# Patient Record
Sex: FEMALE | Race: WHITE | NOT HISPANIC OR LATINO | Employment: UNEMPLOYED | URBAN - METROPOLITAN AREA
[De-identification: names, ages, dates, MRNs, and addresses within clinical notes are randomized per-mention and may not be internally consistent; named-entity substitution may affect disease eponyms.]

---

## 2019-12-06 LAB
EXTERNAL ABO GROUPING: NORMAL
EXTERNAL HEPATITIS B SURFACE ANTIGEN: NEGATIVE
EXTERNAL HEPATITIS C AB: NEGATIVE
EXTERNAL RH FACTOR: NEGATIVE
EXTERNAL RUBELLA QUALITATIVE: NORMAL
EXTERNAL SYPHILIS RPR SCREEN: NORMAL
HIV1 P24 AG SERPL QL IA: NEGATIVE

## 2020-04-11 ENCOUNTER — HOSPITAL ENCOUNTER (INPATIENT)
Facility: HOSPITAL | Age: 30
LOS: 2 days | Discharge: HOME | End: 2020-04-13
Attending: OBSTETRICS & GYNECOLOGY | Admitting: OBSTETRICS & GYNECOLOGY

## 2020-04-11 ENCOUNTER — APPOINTMENT (OUTPATIENT)
Dept: ULTRASOUND IMAGING | Facility: HOSPITAL | Age: 30
End: 2020-04-11

## 2020-04-11 PROBLEM — O36.4XX0 IUFD AT 20 WEEKS OR MORE OF GESTATION: Status: ACTIVE | Noted: 2020-04-11

## 2020-04-11 LAB
ABO GROUP BLD: NORMAL
AMPHET+METHAMPHET UR QL: NEGATIVE
BARBITURATES UR QL SCN: NEGATIVE
BASOPHILS # BLD AUTO: 0.1 10*3/MM3 (ref 0–0.2)
BASOPHILS NFR BLD AUTO: 0.7 % (ref 0–1.5)
BENZODIAZ UR QL SCN: NEGATIVE
BLD GP AB SCN SERPL QL: POSITIVE
CANNABINOIDS SERPL QL: POSITIVE
COCAINE UR QL: NEGATIVE
DEPRECATED RDW RBC AUTO: 44.6 FL (ref 37–54)
EOSINOPHIL # BLD AUTO: 0 10*3/MM3 (ref 0–0.4)
EOSINOPHIL NFR BLD AUTO: 0.3 % (ref 0.3–6.2)
ERYTHROCYTE [DISTWIDTH] IN BLOOD BY AUTOMATED COUNT: 14.1 % (ref 12.3–15.4)
HCT VFR BLD AUTO: 38.6 % (ref 34–46.6)
HGB BLD-MCNC: 13.3 G/DL (ref 12–15.9)
LYMPHOCYTES # BLD AUTO: 1 10*3/MM3 (ref 0.7–3.1)
LYMPHOCYTES NFR BLD AUTO: 10 % (ref 19.6–45.3)
MCH RBC QN AUTO: 31.1 PG (ref 26.6–33)
MCHC RBC AUTO-ENTMCNC: 34.5 G/DL (ref 31.5–35.7)
MCV RBC AUTO: 90.2 FL (ref 79–97)
METHADONE UR QL SCN: NEGATIVE
MONOCYTES # BLD AUTO: 0.3 10*3/MM3 (ref 0.1–0.9)
MONOCYTES NFR BLD AUTO: 3.2 % (ref 5–12)
NEUTROPHILS # BLD AUTO: 8.8 10*3/MM3 (ref 1.7–7)
NEUTROPHILS NFR BLD AUTO: 85.8 % (ref 42.7–76)
NRBC BLD AUTO-RTO: 0.1 /100 WBC (ref 0–0.2)
OPIATES UR QL: NEGATIVE
OXYCODONE UR QL SCN: NEGATIVE
PLATELET # BLD AUTO: 226 10*3/MM3 (ref 140–450)
PMV BLD AUTO: 8.6 FL (ref 6–12)
RBC # BLD AUTO: 4.28 10*6/MM3 (ref 3.77–5.28)
RESIDUAL RHIG DETECTED: NORMAL
RH BLD: NEGATIVE
T&S EXPIRATION DATE: NORMAL
T4 FREE SERPL-MCNC: 1.13 NG/DL (ref 0.93–1.7)
TSH SERPL DL<=0.05 MIU/L-ACNC: 1.95 UIU/ML (ref 0.27–4.2)
WBC NRBC COR # BLD: 10.2 10*3/MM3 (ref 3.4–10.8)
WHOLE BLOOD HOLD SPECIMEN: NORMAL

## 2020-04-11 PROCEDURE — 80307 DRUG TEST PRSMV CHEM ANLYZR: CPT | Performed by: OBSTETRICS & GYNECOLOGY

## 2020-04-11 PROCEDURE — 86777 TOXOPLASMA ANTIBODY: CPT | Performed by: OBSTETRICS & GYNECOLOGY

## 2020-04-11 PROCEDURE — 86870 RBC ANTIBODY IDENTIFICATION: CPT | Performed by: OBSTETRICS & GYNECOLOGY

## 2020-04-11 PROCEDURE — 86900 BLOOD TYPING SEROLOGIC ABO: CPT

## 2020-04-11 PROCEDURE — 81291 MTHFR GENE: CPT | Performed by: OBSTETRICS & GYNECOLOGY

## 2020-04-11 PROCEDURE — 86645 CMV ANTIBODY IGM: CPT | Performed by: OBSTETRICS & GYNECOLOGY

## 2020-04-11 PROCEDURE — 85307 ASSAY ACTIVATED PROTEIN C: CPT | Performed by: OBSTETRICS & GYNECOLOGY

## 2020-04-11 PROCEDURE — 81241 F5 GENE: CPT | Performed by: OBSTETRICS & GYNECOLOGY

## 2020-04-11 PROCEDURE — 84439 ASSAY OF FREE THYROXINE: CPT | Performed by: OBSTETRICS & GYNECOLOGY

## 2020-04-11 PROCEDURE — 83036 HEMOGLOBIN GLYCOSYLATED A1C: CPT | Performed by: OBSTETRICS & GYNECOLOGY

## 2020-04-11 PROCEDURE — 86592 SYPHILIS TEST NON-TREP QUAL: CPT | Performed by: OBSTETRICS & GYNECOLOGY

## 2020-04-11 PROCEDURE — 85732 THROMBOPLASTIN TIME PARTIAL: CPT | Performed by: OBSTETRICS & GYNECOLOGY

## 2020-04-11 PROCEDURE — 86901 BLOOD TYPING SEROLOGIC RH(D): CPT

## 2020-04-11 PROCEDURE — 85210 CLOT FACTOR II PROTHROM SPEC: CPT | Performed by: OBSTETRICS & GYNECOLOGY

## 2020-04-11 PROCEDURE — 84443 ASSAY THYROID STIM HORMONE: CPT | Performed by: OBSTETRICS & GYNECOLOGY

## 2020-04-11 PROCEDURE — 85705 THROMBOPLASTIN INHIBITION: CPT | Performed by: OBSTETRICS & GYNECOLOGY

## 2020-04-11 PROCEDURE — 86694 HERPES SIMPLEX NES ANTBDY: CPT | Performed by: OBSTETRICS & GYNECOLOGY

## 2020-04-11 PROCEDURE — 86778 TOXOPLASMA ANTIBODY IGM: CPT | Performed by: OBSTETRICS & GYNECOLOGY

## 2020-04-11 PROCEDURE — 86850 RBC ANTIBODY SCREEN: CPT | Performed by: OBSTETRICS & GYNECOLOGY

## 2020-04-11 PROCEDURE — 86900 BLOOD TYPING SEROLOGIC ABO: CPT | Performed by: OBSTETRICS & GYNECOLOGY

## 2020-04-11 PROCEDURE — 86762 RUBELLA ANTIBODY: CPT | Performed by: OBSTETRICS & GYNECOLOGY

## 2020-04-11 PROCEDURE — 86644 CMV ANTIBODY: CPT | Performed by: OBSTETRICS & GYNECOLOGY

## 2020-04-11 PROCEDURE — 85300 ANTITHROMBIN III ACTIVITY: CPT | Performed by: OBSTETRICS & GYNECOLOGY

## 2020-04-11 PROCEDURE — 85025 COMPLETE CBC W/AUTO DIFF WBC: CPT | Performed by: OBSTETRICS & GYNECOLOGY

## 2020-04-11 PROCEDURE — 85670 THROMBIN TIME PLASMA: CPT | Performed by: OBSTETRICS & GYNECOLOGY

## 2020-04-11 PROCEDURE — 86747 PARVOVIRUS ANTIBODY: CPT | Performed by: OBSTETRICS & GYNECOLOGY

## 2020-04-11 PROCEDURE — 86147 CARDIOLIPIN ANTIBODY EA IG: CPT | Performed by: OBSTETRICS & GYNECOLOGY

## 2020-04-11 PROCEDURE — 85613 RUSSELL VIPER VENOM DILUTED: CPT | Performed by: OBSTETRICS & GYNECOLOGY

## 2020-04-11 PROCEDURE — 76815 OB US LIMITED FETUS(S): CPT

## 2020-04-11 PROCEDURE — 86901 BLOOD TYPING SEROLOGIC RH(D): CPT | Performed by: OBSTETRICS & GYNECOLOGY

## 2020-04-11 RX ORDER — LIDOCAINE HYDROCHLORIDE 10 MG/ML
5 INJECTION, SOLUTION EPIDURAL; INFILTRATION; INTRACAUDAL; PERINEURAL AS NEEDED
Status: DISCONTINUED | OUTPATIENT
Start: 2020-04-11 | End: 2020-04-11

## 2020-04-11 RX ORDER — SODIUM CHLORIDE 0.9 % (FLUSH) 0.9 %
3 SYRINGE (ML) INJECTION EVERY 12 HOURS SCHEDULED
Status: DISCONTINUED | OUTPATIENT
Start: 2020-04-11 | End: 2020-04-11

## 2020-04-11 RX ORDER — ONDANSETRON 4 MG/1
4 TABLET, FILM COATED ORAL EVERY 6 HOURS PRN
Status: DISCONTINUED | OUTPATIENT
Start: 2020-04-11 | End: 2020-04-13

## 2020-04-11 RX ORDER — SODIUM CHLORIDE 0.9 % (FLUSH) 0.9 %
10 SYRINGE (ML) INJECTION AS NEEDED
Status: DISCONTINUED | OUTPATIENT
Start: 2020-04-11 | End: 2020-04-13

## 2020-04-11 RX ORDER — MORPHINE SULFATE 4 MG/ML
4 INJECTION, SOLUTION INTRAMUSCULAR; INTRAVENOUS
Status: DISCONTINUED | OUTPATIENT
Start: 2020-04-11 | End: 2020-04-13

## 2020-04-11 RX ORDER — SODIUM CHLORIDE, SODIUM LACTATE, POTASSIUM CHLORIDE, CALCIUM CHLORIDE 600; 310; 30; 20 MG/100ML; MG/100ML; MG/100ML; MG/100ML
125 INJECTION, SOLUTION INTRAVENOUS CONTINUOUS
Status: DISCONTINUED | OUTPATIENT
Start: 2020-04-11 | End: 2020-04-13

## 2020-04-11 RX ORDER — ACETAMINOPHEN 325 MG/1
650 TABLET ORAL EVERY 4 HOURS PRN
Status: DISCONTINUED | OUTPATIENT
Start: 2020-04-11 | End: 2020-04-13

## 2020-04-11 RX ORDER — OXYTOCIN-SODIUM CHLORIDE 0.9% IV SOLN 30 UNIT/500ML 30-0.9/5 UT/ML-%
2 SOLUTION INTRAVENOUS
Status: DISCONTINUED | OUTPATIENT
Start: 2020-04-11 | End: 2020-04-12

## 2020-04-11 RX ORDER — SODIUM CHLORIDE 0.9 % (FLUSH) 0.9 %
3-10 SYRINGE (ML) INJECTION AS NEEDED
Status: DISCONTINUED | OUTPATIENT
Start: 2020-04-11 | End: 2020-04-11

## 2020-04-11 RX ORDER — ONDANSETRON 2 MG/ML
4 INJECTION INTRAMUSCULAR; INTRAVENOUS EVERY 6 HOURS PRN
Status: DISCONTINUED | OUTPATIENT
Start: 2020-04-11 | End: 2020-04-13

## 2020-04-11 RX ADMIN — SODIUM CHLORIDE, SODIUM LACTATE, POTASSIUM CHLORIDE, AND CALCIUM CHLORIDE 125 ML/HR: 600; 310; 30; 20 INJECTION, SOLUTION INTRAVENOUS at 16:50

## 2020-04-11 RX ADMIN — OXYTOCIN 2 MILLI-UNITS/MIN: 10 INJECTION INTRAVENOUS at 16:50

## 2020-04-11 RX ADMIN — CEFAZOLIN SODIUM 2 G: 1 INJECTION, POWDER, FOR SOLUTION INTRAMUSCULAR; INTRAVENOUS at 16:50

## 2020-04-11 NOTE — H&P
DEMARIO Howell  Obstetric History and Physical     Chief Complaint: No fetal movement    Subjective     Patient is a 29 y.o. female  currently at 31w1d, who presents with no fetal movement for 2 days.  She reports possible movement yesterday but it was a rolling movement.  She has had no discharge no leakage of fluid no loss of blood.  She reports a normal pregnancy normal glucose test reports receiving RhoGam this pregnancy.  Her prenatal care is with Dr. Berenice Petit at [facility].  She saw her physician last week and had fetal heart tones.  He has had 1 prior vaginal birth 7 pound 6 ounce baby boy 6 years ago.    Her prenatal care is benign.  Her previous obstetric/gynecological history is noted for is non-contributory.      Prenatal Information:  Prenatal Results     POC Urine Glucose/Protein     Test Value Reference Range Date Time    Urine Glucose        Urine Protein              Initial Prenatal Labs     Test Value Reference Range Date Time    Hemoglobin        Hematocrit        Platelets        Rubella IgG Immune   19     Hepatitis B SAg Negative   19     Hepatitis C Ab negative   19     RPR Non-Reactive   19     ABO A   19     Rh Negative   19     Antibody Screen        HIV Negative   19     Urine Culture        Gonorrhea        Chlamydia        TSH              2nd and 3rd Trimester     Test Value Reference Range Date Time    Hemoglobin (repeated)        Hematocrit (repeated)        GCT        Antibody Screen (repeated)        GTT Fasting        GTT 1 Hr        GTT 2 Hr        GTT 3 Hr        Group B Strep              Drug Screening     Test Value Reference Range Date Time    Amphetamine Screen        Barbiturate Screen        Benzodiazepine Screen        Methadone Screen        Phencyclidine Screen        Opiates Screen        THC Screen        Cocaine Screen        Propoxyphene Screen        Buprenorphine Screen        Methamphetamine Screen        Oxycodone  Screen        Tricyclic Antidepressants Screen              Other (Risk screening)     Test Value Reference Range Date Time    Varicella IgG        Parvovirus IgG        CMV IgG        Cystic Fibrosis        Hemoglobin electrophoresis        NIPT        MSAFP-4        AFP (for NTD only)                  External Prenatal Results     Pregnancy Outside Results - Transcribed From Office Records - See Scanned Records For Details     Test Value Date Time    Hgb       Hct       ABO A  12/06/19     Rh Negative  12/06/19     Antibody Screen       Glucose Fasting GTT       Glucose Tolerance Test 1 hour       Glucose Tolerance Test 3 hour       Gonorrhea (discrete)       Chlamydia (discrete)       RPR Non-Reactive  12/06/19     VDRL       Syphilis Antibody       Rubella Immune  12/06/19     HBsAg Negative  12/06/19     Herpes Simplex Virus PCR       Herpes Simplex VIrus Culture       HIV Negative  12/06/19     Hep C RNA Quant PCR       Hep C Antibody negative  12/06/19     AFP       Group B Strep       GBS Susceptibility to Clindamycin       GBS Susceptibility to Erythromycin       Fetal Fibronectin       Genetic Testing, Maternal Blood             Drug Screening     Test Value Date Time    Urine Drug Screen       Amphetamine Screen       Barbiturate Screen       Benzodiazepine Screen       Methadone Screen       Phencyclidine Screen       Opiates Screen       THC Screen       Cocaine Screen       Propoxyphene Screen       Buprenorphine Screen       Methamphetamine Screen       Oxycodone Screen       Tricyclic Antidepressants Screen                    Past OB History: G2, P1 status post 1 vaginal birth       Past Medical History: History reviewed. No pertinent past medical history.     Past Surgical History History reviewed. No pertinent surgical history.     Family History: History reviewed. No pertinent family history.   Social History:  reports that she has never smoked. She has never used smokeless tobacco.   reports that  "she drank alcohol.   reports that she does not use drugs.        General ROS: Pertinent items are noted in HPI    Objective      Vitals:     Vitals:    04/11/20 1422 04/11/20 1431 04/11/20 1501 04/11/20 1607   BP: 154/99 139/79 112/96 137/80   BP Location: Right arm      Patient Position: Sitting      Pulse: 78 69 93 70   Resp: 18      Temp: 98 °F (36.7 °C)      TempSrc: Oral      SpO2: 100%      Weight: 89.2 kg (196 lb 10.4 oz)      Height: 170.2 cm (67\")          Fetal Heart Rate Assessment:   No fetal heart tones    [place]:   Quiet     Physical Exam:     General Appearance:    Alert, cooperative, in no acute distress   Lungs:     Clear to auscultation,respirations regular.    Heart:    Regular rhythm and normal rate.   Breast Exam:    Deferred   Abdomen:     Normal bowel sounds, no masses, soft non-tender,         non-distended, no guarding, no rebound tenderness   Pelvic Exam:   Fingertip 50% -1 station  Bedside ultrasound performed confirming no fetal heart tones no blood flow in the head or the heart.  The heart was seen there is some mild swelling at the skull.  There is minimal amniotic fluid around the baby.    Presentation: Cephalic    Cervix: Above   Extremities:   Moves all extremities well, no edema, no cyanosis, no              redness   Skin:   No bleeding, bruising or rash   Neurologic:   No focal neurologic defect          Laboratory Results:   Lab Results (last 48 hours)     Procedure Component Value Units Date/Time    HSV Non-Specific Antibody, IgM [[id number]] Updated:  04/11/20 1655    Specimen:  Blood     Antithrombin III [[id number]] Collected:  04/11/20 1603    Specimen:  Blood Updated:  04/11/20 1639    RPR [[id number]] Collected:  04/11/20 1603    Specimen:  Blood Updated:  04/11/20 1639    Cytomegalovirus Antibody, IgG [[id number]] Collected:  04/11/20 1603    Specimen:  Blood Updated:  04/11/20 1638    Rubella Antibody, IgM [[id number]] Collected:  04/11/20 1603    Specimen:  Blood Updated:  " 04/11/20 1638    Cytomegalovirus Antibody, IgM [[id number]] Collected:  04/11/20 1603    Specimen:  Blood Updated:  04/11/20 1638    Cardiolipin Antibody [[id number]] Collected:  04/11/20 1603    Specimen:  Blood Updated:  04/11/20 1638    Urine Drug Screen - [[id number]] Collected:  04/11/20 1540    Specimen:  Urine Updated:  04/11/20 1638    Hepatitis B Surface Antigen [[id number]] Resulted:  12/06/19     Specimen:  Blood Updated:  04/11/20 1624     External Hepatitis B Surface Ag Negative    RPR [[id number]] Resulted:  12/06/19     Specimen:  Blood Updated:  04/11/20 1624     External RPR Non-Reactive    Rubella Antibody, IgG [[id number]] Resulted:  12/06/19     Specimen:  Blood Updated:  04/11/20 1624     External Rubella Qual Immune    HIV-1 Antibody, EIA [[id number]] Resulted:  12/06/19     Specimen:  Blood Updated:  04/11/20 1624     External HIV Antibody Negative    Hepatitis C Antibody [[id number]] Resulted:  12/06/19     Specimen:  Blood Updated:  04/11/20 1624     External Hepatitis C Ab negative          Other Studies:      Assessment/Plan     Active Problems:    IUFD at 20 weeks or more of gestation         Assessment:  1.  Intrauterine pregnancy at 31w1d gestation with IUFD   2.  IUFD confirmed at bedside and official ultrasound performed by ultrasonographer also confirms this.   3.  Obstetrical history significant for is non-contributory.  4.  GBS status: No results found for: STREPGPB    Plan:  1. Vaginal anticipated  2. Plan of care has been reviewed with patient.  3.  Risks, benefits of treatment plan have been discussed.  4.  All questions have been answered.  5.  Induction of labor with Pitocin for IUFD.  IUFD panel ordered.  Patient desires epidural once pain begins.  She receive that once able.   has been called and is available  will be with the patient for delivery.        Fredi Benedict MD   4/11/2020   17:06

## 2020-04-11 NOTE — NURSING NOTE
PT presents to   for decreased fm for 2 days.  Pt see's Dr Petit at [place], but lives close, so she came here.  Pt denies any vb, lof, or cramping/abd pain.  RN unable to find FHT's, called MD to bedside with U/S machine.  No cardiac activity noted per U/S.

## 2020-04-11 NOTE — PLAN OF CARE
Plan of care discussed with patient and . Support offered,  in to see . Pt tolerating pitocin infusion.

## 2020-04-12 ENCOUNTER — ANESTHESIA EVENT (OUTPATIENT)
Dept: LABOR AND DELIVERY | Facility: HOSPITAL | Age: 30
End: 2020-04-12

## 2020-04-12 ENCOUNTER — ANESTHESIA (OUTPATIENT)
Dept: LABOR AND DELIVERY | Facility: HOSPITAL | Age: 30
End: 2020-04-12

## 2020-04-12 LAB
F5 GENE MUT ANL BLD/T: NORMAL
HGB F BLD QL KLEIH BETKE: NORMAL

## 2020-04-12 PROCEDURE — 25010000002 ROPIVACAINE PER 1 MG: Performed by: ANESTHESIOLOGY

## 2020-04-12 PROCEDURE — 25010000002 MORPHINE PER 10 MG: Performed by: OBSTETRICS & GYNECOLOGY

## 2020-04-12 PROCEDURE — 25010000002 ONDANSETRON PER 1 MG: Performed by: OBSTETRICS & GYNECOLOGY

## 2020-04-12 PROCEDURE — 88307 TISSUE EXAM BY PATHOLOGIST: CPT | Performed by: OBSTETRICS & GYNECOLOGY

## 2020-04-12 PROCEDURE — 85460 HEMOGLOBIN FETAL: CPT | Performed by: OBSTETRICS & GYNECOLOGY

## 2020-04-12 PROCEDURE — C1755 CATHETER, INTRASPINAL: HCPCS | Performed by: ANESTHESIOLOGY

## 2020-04-12 RX ORDER — ROPIVACAINE HYDROCHLORIDE 2 MG/ML
INJECTION, SOLUTION EPIDURAL; INFILTRATION; PERINEURAL CONTINUOUS PRN
Status: DISCONTINUED | OUTPATIENT
Start: 2020-04-12 | End: 2020-04-12 | Stop reason: SURG

## 2020-04-12 RX ORDER — OXYTOCIN-SODIUM CHLORIDE 0.9% IV SOLN 30 UNIT/500ML 30-0.9/5 UT/ML-%
125 SOLUTION INTRAVENOUS CONTINUOUS PRN
Status: COMPLETED | OUTPATIENT
Start: 2020-04-12 | End: 2020-04-12

## 2020-04-12 RX ORDER — METHYLERGONOVINE MALEATE 0.2 MG/ML
200 INJECTION INTRAVENOUS ONCE AS NEEDED
Status: DISCONTINUED | OUTPATIENT
Start: 2020-04-12 | End: 2020-04-13

## 2020-04-12 RX ORDER — FAMOTIDINE 20 MG/1
20 TABLET, FILM COATED ORAL
Status: DISCONTINUED | OUTPATIENT
Start: 2020-04-12 | End: 2020-04-13

## 2020-04-12 RX ORDER — ROPIVACAINE HYDROCHLORIDE 2 MG/ML
INJECTION, SOLUTION EPIDURAL; INFILTRATION; PERINEURAL AS NEEDED
Status: DISCONTINUED | OUTPATIENT
Start: 2020-04-12 | End: 2020-04-12 | Stop reason: SURG

## 2020-04-12 RX ORDER — MISOPROSTOL 200 UG/1
800 TABLET ORAL AS NEEDED
Status: DISCONTINUED | OUTPATIENT
Start: 2020-04-12 | End: 2020-04-13

## 2020-04-12 RX ORDER — IBUPROFEN 600 MG/1
600 TABLET ORAL EVERY 6 HOURS PRN
Status: DISCONTINUED | OUTPATIENT
Start: 2020-04-12 | End: 2020-04-13

## 2020-04-12 RX ORDER — OXYTOCIN-SODIUM CHLORIDE 0.9% IV SOLN 30 UNIT/500ML 30-0.9/5 UT/ML-%
250 SOLUTION INTRAVENOUS CONTINUOUS
Status: DISCONTINUED | OUTPATIENT
Start: 2020-04-12 | End: 2020-04-12

## 2020-04-12 RX ORDER — LIDOCAINE HYDROCHLORIDE AND EPINEPHRINE 15; 5 MG/ML; UG/ML
INJECTION, SOLUTION EPIDURAL AS NEEDED
Status: DISCONTINUED | OUTPATIENT
Start: 2020-04-12 | End: 2020-04-12 | Stop reason: SURG

## 2020-04-12 RX ORDER — FAMOTIDINE 10 MG/ML
20 INJECTION, SOLUTION INTRAVENOUS DAILY
Status: DISCONTINUED | OUTPATIENT
Start: 2020-04-12 | End: 2020-04-12 | Stop reason: SDUPTHER

## 2020-04-12 RX ORDER — CALCIUM CARBONATE 200(500)MG
1 TABLET,CHEWABLE ORAL 3 TIMES DAILY PRN
Status: DISCONTINUED | OUTPATIENT
Start: 2020-04-12 | End: 2020-04-13

## 2020-04-12 RX ORDER — ROPIVACAINE HYDROCHLORIDE 2 MG/ML
INJECTION, SOLUTION EPIDURAL; INFILTRATION; PERINEURAL
Status: COMPLETED
Start: 2020-04-12 | End: 2020-04-12

## 2020-04-12 RX ORDER — ACETAMINOPHEN 325 MG/1
650 TABLET ORAL EVERY 4 HOURS PRN
Status: DISCONTINUED | OUTPATIENT
Start: 2020-04-12 | End: 2020-04-13

## 2020-04-12 RX ORDER — CARBOPROST TROMETHAMINE 250 UG/ML
250 INJECTION, SOLUTION INTRAMUSCULAR AS NEEDED
Status: DISCONTINUED | OUTPATIENT
Start: 2020-04-12 | End: 2020-04-13

## 2020-04-12 RX ORDER — MISOPROSTOL 100 UG/1
50 TABLET ORAL ONCE
Status: COMPLETED | OUTPATIENT
Start: 2020-04-12 | End: 2020-04-12

## 2020-04-12 RX ORDER — MISOPROSTOL 100 UG/1
75 TABLET ORAL ONCE
Status: COMPLETED | OUTPATIENT
Start: 2020-04-12 | End: 2020-04-12

## 2020-04-12 RX ORDER — OXYTOCIN-SODIUM CHLORIDE 0.9% IV SOLN 30 UNIT/500ML 30-0.9/5 UT/ML-%
999 SOLUTION INTRAVENOUS ONCE
Status: DISCONTINUED | OUTPATIENT
Start: 2020-04-12 | End: 2020-04-13

## 2020-04-12 RX ORDER — OXYTOCIN-SODIUM CHLORIDE 0.9% IV SOLN 30 UNIT/500ML 30-0.9/5 UT/ML-%
2 SOLUTION INTRAVENOUS
Status: DISCONTINUED | OUTPATIENT
Start: 2020-04-12 | End: 2020-04-13

## 2020-04-12 RX ORDER — OXYTOCIN-SODIUM CHLORIDE 0.9% IV SOLN 30 UNIT/500ML 30-0.9/5 UT/ML-%
125 SOLUTION INTRAVENOUS CONTINUOUS PRN
Status: DISCONTINUED | OUTPATIENT
Start: 2020-04-12 | End: 2020-04-13

## 2020-04-12 RX ADMIN — SODIUM CHLORIDE, SODIUM LACTATE, POTASSIUM CHLORIDE, AND CALCIUM CHLORIDE 125 ML/HR: 600; 310; 30; 20 INJECTION, SOLUTION INTRAVENOUS at 09:10

## 2020-04-12 RX ADMIN — MORPHINE SULFATE 4 MG: 4 INJECTION INTRAVENOUS at 15:58

## 2020-04-12 RX ADMIN — MORPHINE SULFATE 4 MG: 4 INJECTION INTRAVENOUS at 18:15

## 2020-04-12 RX ADMIN — Medication 10 ML: at 08:25

## 2020-04-12 RX ADMIN — ROPIVACAINE HYDROCHLORIDE 10 ML/HR: 2 INJECTION, SOLUTION EPIDURAL; INFILTRATION at 17:06

## 2020-04-12 RX ADMIN — ROPIVACAINE HYDROCHLORIDE 5 ML: 2 INJECTION, SOLUTION EPIDURAL; INFILTRATION at 17:02

## 2020-04-12 RX ADMIN — SODIUM CHLORIDE, SODIUM LACTATE, POTASSIUM CHLORIDE, AND CALCIUM CHLORIDE 125 ML/HR: 600; 310; 30; 20 INJECTION, SOLUTION INTRAVENOUS at 00:37

## 2020-04-12 RX ADMIN — Medication 10 ML: at 15:58

## 2020-04-12 RX ADMIN — ONDANSETRON 4 MG: 2 INJECTION INTRAMUSCULAR; INTRAVENOUS at 13:53

## 2020-04-12 RX ADMIN — CALCIUM CARBONATE (ANTACID) CHEW TAB 500 MG 1 TABLET: 500 CHEW TAB at 13:19

## 2020-04-12 RX ADMIN — MISOPROSTOL 75 MCG: 100 TABLET ORAL at 13:54

## 2020-04-12 RX ADMIN — LIDOCAINE HYDROCHLORIDE AND EPINEPHRINE 4 ML: 15; 5 INJECTION, SOLUTION EPIDURAL at 16:59

## 2020-04-12 RX ADMIN — OXYTOCIN 125 ML/HR: 10 INJECTION INTRAVENOUS at 20:14

## 2020-04-12 RX ADMIN — MISOPROSTOL 50 MCG: 100 TABLET ORAL at 08:49

## 2020-04-12 RX ADMIN — ONDANSETRON 4 MG: 2 INJECTION INTRAMUSCULAR; INTRAVENOUS at 07:51

## 2020-04-12 RX ADMIN — FAMOTIDINE 20 MG: 10 INJECTION, SOLUTION INTRAVENOUS at 08:25

## 2020-04-12 RX ADMIN — SODIUM CHLORIDE, SODIUM LACTATE, POTASSIUM CHLORIDE, AND CALCIUM CHLORIDE 1000 ML: 600; 310; 30; 20 INJECTION, SOLUTION INTRAVENOUS at 16:30

## 2020-04-12 RX ADMIN — FAMOTIDINE 20 MG: 20 TABLET, FILM COATED ORAL at 23:58

## 2020-04-12 RX ADMIN — Medication 10 ML: at 07:50

## 2020-04-12 RX ADMIN — CALCIUM CARBONATE (ANTACID) CHEW TAB 500 MG 1 TABLET: 500 CHEW TAB at 19:55

## 2020-04-12 RX ADMIN — ONDANSETRON 4 MG: 2 INJECTION INTRAMUSCULAR; INTRAVENOUS at 22:04

## 2020-04-12 NOTE — ANESTHESIA PROCEDURE NOTES
Labor Epidural      Patient reassessed immediately prior to procedure    Patient location during procedure: OB  Indication:at surgeon's request  Performed By  Anesthesiologist: Hardik Hawkins MD  Preanesthetic Checklist  Completed: patient identified, site marked, surgical consent, pre-op evaluation, timeout performed, IV checked, risks and benefits discussed and monitors and equipment checked  Additional Notes  Pre-procedure:  Epidural catheter placement performed at the request of the patient and the obstetrician for the management of acute obstetric pain; patient identified; pre-procedure vital signs, all relevant labs/studies, complete medical/surgical/anesthetic history, full medication list, full allergy list, and NPO status obtained/reviewed; physical assessment performed; anesthetic options, side effects, potential complications, risks, and benefits discussed; questions answered; patient wishes to proceed with the procedure; written anesthesia procedure consent obtained; patient cleared for procedure; time out performed; IV access in situ    Procedure:  ASA monitor placed; patient positioned; hand hygiene performed; sterile technique maintained throughout the procedure; sterile prep and drape applied; insertion site determined by anatomical landmarks and palpation; skin and subcutaneous tissues numbed by injection of 1% lidocaine; epidural needle placed into the skin and advanced into the epidural space with correct needle placement confirmed by using a loss-of-resistance technique using a Luer-slip loss of resistance glass syringe filled with saline; epidural catheter inserted through the epidural needle and threaded into the epidural space; epidural needle removed over the epidural catheter; epidural catheter secured firmly in place with epidural pad, occlusive dressing, and tape; filter applied to epidural catheter; aspiration through epidural catheter was negative for blood or CSE; lidocaine 1.5% +  epinephrine 1:200,000 was administered epidurally as a test dose; test dose was negative for intravascular or intrathecal injection; epidural catheter was dosed; continuous epidural infusion was started; patient controled epidural analgesia bolus doses available    Post-procedure:  Lumbar epidural catheter placed successfully; good block; no apparent complications; minimal estimated blood loss; vital signs stable throughout; see nurse's notes for vitals; will follow patient  Prep:  Pt Position:sitting  Sterile Tech:cap, gloves, gown, mask and sterile barrier  Prep:povidone-iodine 7.5% surgical scrub  Monitoring:blood pressure monitoring, continuous pulse oximetry and EKG  Epidural Block Procedure:  Approach:midline  Guidance:landmark technique  Location:L4-L5  Needle Type:Tuohy  Needle Gauge:17 G  Loss of Resistance Medium: saline  Loss of Resistance: 6cm  Cath Depth at skin:11 cm  Paresthesia: none  Aspiration:negative  Test Dose:negative  Number of Attempts: 1  Post Assessment:  Dressing:secured with tape  Pt Tolerance:patient tolerated the procedure well with no apparent complications  Complications:no

## 2020-04-12 NOTE — L&D DELIVERY NOTE
[facility]  Vaginal Delivery Note    Diagnosis     Patient is a 29 y.o. female  currently at 31w2d, who presents with IUFD.      Delivery     Delivery:  Spontaneous Vaginal Delivery    Date of Delivery:  2020   Anesthesia: This patient has no babies on file.  Epidural   Delivering clinician: Fredi Benedict MD      Delivery narrative: This patient presented to labor and delivery with no fetal movement x2 days.  She was diagnosed with an IUFD with 2 separate ultrasounds.  Induction of labor was started with Pitocin and then the Pitocin was stopped she was then given Cytotec vaginally and progressed to complete complete +2 station she pushed with excellent effort delivered with the nurses in the room.  I was in an emergency GYN case at the time.  The infant had the following findings that I confirmed there was a cord accident with the cord wrapped around both wrists of the baby in front of the baby at the chest.  The cord was very tight and was the cause of death.  The infant has a normal appearance and is a male.  There was meconium stained fluid with meconium stained membranes and a placenta that was very small.  The cord was clamped and cut and the nurses placed the infant in the warmer.  The placenta delivered spontaneously intact with a three-vessel cord and was set aside.  The uterus cervix and vagina were explored and no lacerations were noted.  The fundus was firm and hemostasis was noted at the end of the case mom and baby are in her room at this time the Apgars are 00.    Infant    Findings: VMI this is a nonviable male infant     Apgars:  00 at 1 and 5 minutes.      Placenta, Cord, and Fluid    Placenta delivered  spontaneous  3VC          Lacerations       had no lacerations. with 3.0 Vicryl rapide in the usual fashion.     Estimated Blood Loss 300cc     Complications  none    Disposition  Mother to Remain in LD  in stable condition currently.  Baby to remains with mom  in  condition  currently.      Fredi Benedict MD  04/12/20  19:37

## 2020-04-12 NOTE — NURSING NOTE
"Pt in bathroom, vomiting since  0720. She declined any meds for this stating \"it never works anyways,\" routine daily at 0400 and 0700. Feels better but having \"heartburn.\" Dr Benedict informed and orders received. Informed of cytotec not placed due to pt in bathroom. Pt states the \"belts make me sick\" due to pressure of the belt on stomach.\" Denies any pain or ctx's at this time. Will place belts back on when cytotec placed.   "

## 2020-04-12 NOTE — PROGRESS NOTES
" [facility]  Obstetric Progress Note    Subjective   Slept overnight Pitocin stopped and Cytotec placed due to no change in cervical exam    Objective     Vitals:  Vitals:    04/12/20 0700 04/12/20 0800 04/12/20 0830 04/12/20 0900   BP: 123/68 130/66 119/64 128/75   BP Location:   Right arm    Pulse: 66 70 82 76   Resp:   16    Temp:   98.5 °F (36.9 °C)    TempSrc:   Oral    SpO2:       Weight:       Height:         Flowsheet Rows      First Filed Value   Admission Height  170.2 cm (67\") Documented at 04/11/2020 1422   Admission Weight  89.2 kg (196 lb 10.4 oz) Documented at 04/11/2020 1422        No intake or output data in the 24 hours ending 04/12/20 0928    Fetal Heart Rate Assessment:   Category 1  [place]:  Difficult to trace    Physical Exam:  General: Patient is in no acute distress    Pelvic Exam: Closed 50 mid position soft 0 station cephalic presentation            Assessment/Plan     Active Problems:    IUFD at 20 weeks or more of gestation         Assessment:  1.  Intrauterine pregnancy at 31w2d gestation with IUFD  2.  induction of labor  for IUFD  with unfavorable cervix  3.  Obstetrical history significant for is non-contributory.  4.  GBS status: No results found for: STREPGPB    Plan:  1. Vaginal anticipated  2. Plan of care has been reviewed with patient.  3.  Risks, benefits of treatment plan have been discussed.  4.  All questions have been answered.  5.  Epidural when desired will use Cytotec 50 vaginally and then rupture when able      Fredi Benedict MD  4/12/2020  09:28      "

## 2020-04-12 NOTE — NURSING NOTE
Dr. Benedict called to check on patient. Report given. Orders received to stop pitocin and do vaginal exam and place Cytotec 50mcg vaginally.

## 2020-04-12 NOTE — PROGRESS NOTES
" [facility]  Obstetric Progress Note    Subjective   Feeling contractions    Objective     Vitals:  Vitals:    04/12/20 1100 04/12/20 1200 04/12/20 1400 04/12/20 1603   BP: 119/59 125/75 136/68 137/70   BP Location:    Left arm   Patient Position:    Lying   Pulse: 67 66 74 75   Resp:  16  18   Temp:  98.8 °F (37.1 °C)  99.1 °F (37.3 °C)   TempSrc:  Oral  Oral   SpO2:       Weight:       Height:         Flowsheet Rows      First Filed Value   Admission Height  170.2 cm (67\") Documented at 04/11/2020 1422   Admission Weight  89.2 kg (196 lb 10.4 oz) Documented at 04/11/2020 1422          Intake/Output Summary (Last 24 hours) at 4/12/2020 1626  Last data filed at 4/12/2020 1600  Gross per 24 hour   Intake --   Output 300 ml   Net -300 ml       Fetal Heart Rate Assessment:   No fetal heart tones  [employer]:  Every 1 to 3 minutes    Physical Exam:  General: Patient is in no acute distress    Pelvic Exam: 2 cm 75% effaced 0 station cephalic artificial rupture of membranes performed without difficulty no fluid return            Assessment/Plan     Active Problems:    IUFD at 20 weeks or more of gestation         Assessment:  1.  Intrauterine pregnancy at 31w2d gestation with IUFD.    2.  induction of labor  for IUFD  with unfavorable cervix  3.  Obstetrical history significant for is non-contributory.  4.  GBS status: No results found for: STREPGPB    Plan:  1. Vaginal anticipated  2. Plan of care has been reviewed with patient.  3.  Risks, benefits of treatment plan have been discussed.  4.  All questions have been answered.  5.  Anticipate vaginal birth patient does not desire epidural at this time will give morphine IV      Fredi Benedict MD  4/12/2020  16:26      "

## 2020-04-12 NOTE — ANESTHESIA PREPROCEDURE EVALUATION
Anesthesia Evaluation     Patient summary reviewed and Nursing notes reviewed                Airway   Mallampati: II  TM distance: >3 FB  Neck ROM: full  No difficulty expected  Dental - normal exam     Pulmonary - normal exam   Cardiovascular - normal exam        Neuro/Psych  GI/Hepatic/Renal/Endo      Musculoskeletal     Abdominal  - normal exam    Bowel sounds: normal.   Substance History      OB/GYN    (+) Pregnant,         Other        ROS/Med Hx Other: IUFD                Anesthesia Plan    ASA 1     epidural       Anesthetic plan, all risks, benefits, and alternatives have been provided, discussed and informed consent has been obtained with: patient.

## 2020-04-13 VITALS
HEART RATE: 67 BPM | SYSTOLIC BLOOD PRESSURE: 131 MMHG | BODY MASS INDEX: 30.87 KG/M2 | OXYGEN SATURATION: 97 % | DIASTOLIC BLOOD PRESSURE: 83 MMHG | RESPIRATION RATE: 17 BRPM | WEIGHT: 196.65 LBS | HEIGHT: 67 IN | TEMPERATURE: 98.4 F

## 2020-04-13 LAB
AT III PPP CHRO-ACNC: 97 % (ref 75–120)
BASOPHILS # BLD AUTO: 0 10*3/MM3 (ref 0–0.2)
BASOPHILS NFR BLD AUTO: 0.4 % (ref 0–1.5)
DEPRECATED RDW RBC AUTO: 44.6 FL (ref 37–54)
EOSINOPHIL # BLD AUTO: 0 10*3/MM3 (ref 0–0.4)
EOSINOPHIL NFR BLD AUTO: 0.5 % (ref 0.3–6.2)
ERYTHROCYTE [DISTWIDTH] IN BLOOD BY AUTOMATED COUNT: 14.1 % (ref 12.3–15.4)
HBA1C MFR BLD: 4.9 % (ref 3.5–5.6)
HCT VFR BLD AUTO: 36 % (ref 34–46.6)
HGB BLD-MCNC: 13.1 G/DL (ref 12–15.9)
LYMPHOCYTES # BLD AUTO: 2.2 10*3/MM3 (ref 0.7–3.1)
LYMPHOCYTES NFR BLD AUTO: 23.2 % (ref 19.6–45.3)
MCH RBC QN AUTO: 32.6 PG (ref 26.6–33)
MCHC RBC AUTO-ENTMCNC: 36.4 G/DL (ref 31.5–35.7)
MCV RBC AUTO: 89.8 FL (ref 79–97)
MONOCYTES # BLD AUTO: 0.7 10*3/MM3 (ref 0.1–0.9)
MONOCYTES NFR BLD AUTO: 6.9 % (ref 5–12)
NEUTROPHILS # BLD AUTO: 6.5 10*3/MM3 (ref 1.7–7)
NEUTROPHILS NFR BLD AUTO: 69 % (ref 42.7–76)
NRBC BLD AUTO-RTO: 0.1 /100 WBC (ref 0–0.2)
NUMBER OF DOSES: NORMAL
PLATELET # BLD AUTO: 239 10*3/MM3 (ref 140–450)
PMV BLD AUTO: 8.4 FL (ref 6–12)
RBC # BLD AUTO: 4.01 10*6/MM3 (ref 3.77–5.28)
RPR SER QL: NORMAL
RUBV AB SER QL: NORMAL
WBC NRBC COR # BLD: 9.4 10*3/MM3 (ref 3.4–10.8)

## 2020-04-13 PROCEDURE — 85025 COMPLETE CBC W/AUTO DIFF WBC: CPT | Performed by: OBSTETRICS & GYNECOLOGY

## 2020-04-13 PROCEDURE — 25010000002 RHO D IMMUNE GLOBULIN 1500 UNIT/2ML SOLUTION PREFILLED SYRINGE: Performed by: OBSTETRICS & GYNECOLOGY

## 2020-04-13 RX ORDER — PRENATAL VIT/IRON FUM/FOLIC AC 27MG-0.8MG
1 TABLET ORAL DAILY
Status: DISCONTINUED | OUTPATIENT
Start: 2020-04-13 | End: 2020-04-13 | Stop reason: HOSPADM

## 2020-04-13 RX ORDER — IBUPROFEN 600 MG/1
600 TABLET ORAL EVERY 6 HOURS PRN
Status: DISCONTINUED | OUTPATIENT
Start: 2020-04-13 | End: 2020-04-13 | Stop reason: HOSPADM

## 2020-04-13 RX ORDER — DOCUSATE SODIUM 100 MG/1
100 CAPSULE, LIQUID FILLED ORAL 2 TIMES DAILY
Status: DISCONTINUED | OUTPATIENT
Start: 2020-04-13 | End: 2020-04-13 | Stop reason: HOSPADM

## 2020-04-13 RX ORDER — ONDANSETRON 4 MG/1
4 TABLET, FILM COATED ORAL EVERY 8 HOURS PRN
Status: DISCONTINUED | OUTPATIENT
Start: 2020-04-13 | End: 2020-04-13 | Stop reason: HOSPADM

## 2020-04-13 RX ORDER — LANOLIN 100 %
OINTMENT (GRAM) TOPICAL
Status: DISCONTINUED | OUTPATIENT
Start: 2020-04-13 | End: 2020-04-13 | Stop reason: HOSPADM

## 2020-04-13 RX ORDER — SODIUM CHLORIDE 0.9 % (FLUSH) 0.9 %
1-10 SYRINGE (ML) INJECTION AS NEEDED
Status: DISCONTINUED | OUTPATIENT
Start: 2020-04-13 | End: 2020-04-13 | Stop reason: HOSPADM

## 2020-04-13 RX ADMIN — HUMAN RHO(D) IMMUNE GLOBULIN 1500 UNITS: 1500 SOLUTION INTRAMUSCULAR; INTRAVENOUS at 03:06

## 2020-04-13 NOTE — PROGRESS NOTES
Pt discharged prior to  being able to complete consult with pt.     ZAIN Marion    Phone: [phone]  Cell: [phone]  Fax: [phone]  [email or web address]

## 2020-04-13 NOTE — NURSING NOTE
"Dr Benedict informed while in Emergent Surgery of pt 5cm and cervical pain, station to inform of progression. Pt c/o severe pain to \"cervix\" and Dr Hawkins informed also. Unable to come up at the time of call due to same emergent surgery but would be up to dose. Pushed bolus dose but pt continued to feel pressure, pant blowing with pt but unable to control pushing efforts. Infant head delivered and body onto bed with this nurse assisting. Cord clamped but cord was tightly wrapped around both wrists of infant. Infant taken over to warmer and covered due to parents not wanting to view infant or hold. Dr Benedict was called again and would be up asap. Dr Benedict arrived at 1842 and placenta delivered. Pitocin was started at 1845.   "

## 2020-04-13 NOTE — PLAN OF CARE
Pt had some nausea in the night but it was relieved with iv zofran. She also had c/o heart burn that was relieved some with oral pepcid. She rested well in the night, had minimal bleeding and no pain. She did not want wish to see or hold baby before it was taken to the  home. She has been tearful at times but has a positive outlook. If her labs come back normal this am, she will discharge to home.

## 2020-04-13 NOTE — PAYOR COMM NOTE
"THIS IS DELIVERY NOTIFICATION ONLY FOR ANNE MULLIGAN    PT HAD VAGINAL DELIVERY ON 20--PT WAS INDUCED FOR FETAL DEMISE.    NOTE: THIS IS NOT A PA REQUEST, JUST NOTIFICATION OF DELIVERY.      Anne Mulligan (29 y.o. Female)     Date of Birth Social Security Number Address Home Phone MRN    1990  [id number] [id number] [phone] [id number]    Religious Marital Status          None        Admission Date Admission Type Admitting Provider Attending Provider Department, Room/Bed    20 Elective Fredi Benedict MD Huecker, Braidi Rose, MD [facility] LABOR AND DELIVERY, L467/1    Discharge Date Discharge Disposition Discharge Destination                       Attending Provider:  Fredi Benedict MD    Allergies:  No Known Allergies    Isolation:  None   Infection:  None   Code Status:  CPR    Ht:  170.2 cm (67\")   Wt:  89.2 kg (196 lb 10.4 oz)    Admission Cmt:  None   Principal Problem:  None                Active Insurance as of 2020     Primary Coverage     Payor Plan Insurance Group Employer/Plan Group    [place] [id number]      Payor Plan Address Payor Plan Phone Number Payor Plan Fax Number Effective Dates    [address]   2020 - None Entered    [place] [id number]       Subscriber Name Subscriber Birth Date Member ID       ANNE MULLIGAN 1990 [id number]                 Emergency Contacts      (Rel.) Home Phone Work Phone Mobile Phone    HOWIE STALLWORTH (Mother) [phone] -- [phone]               Operative/Procedure Notes (last 72 hours) (Notes from 04/10/20 0732 through 20 0732)      Fredi Benedict MD at 20 1937          [facility]  Vaginal Delivery Note    Diagnosis     Patient is a 29 y.o. female  currently at 31w2d, who presents with IUFD.      Delivery     Delivery:  Spontaneous Vaginal Delivery    Date of Delivery:  2020   Anesthesia: This " patient has no babies on file.  Epidural   Delivering clinician: Fredi Benedict MD      Delivery narrative: This patient presented to labor and delivery with no fetal movement x2 days.  She was diagnosed with an IUFD with 2 separate ultrasounds.  Induction of labor was started with Pitocin and then the Pitocin was stopped she was then given Cytotec vaginally and progressed to complete complete +2 station she pushed with excellent effort delivered with the nurses in the room.  I was in an emergency GYN case at the time.  The infant had the following findings that I confirmed there was a cord accident with the cord wrapped around both wrists of the baby in front of the baby at the chest.  The cord was very tight and was the cause of death.  The infant has a normal appearance and is a male.  There was meconium stained fluid with meconium stained membranes and a placenta that was very small.  The cord was clamped and cut and the nurses placed the infant in the warmer.  The placenta delivered spontaneously intact with a three-vessel cord and was set aside.  The uterus cervix and vagina were explored and no lacerations were noted.  The fundus was firm and hemostasis was noted at the end of the case mom and baby are in her room at this time the Apgars are 00.    Infant    Findings: VMI this is a nonviable male infant     Apgars:  00 at 1 and 5 minutes.      Placenta, Cord, and Fluid    Placenta delivered  spontaneous  3VC          Lacerations       had no lacerations. with 3.0 Vicryl rapide in the usual fashion.     Estimated Blood Loss 300cc     Complications  none    Disposition  Mother to Remain in LD  in stable condition currently.  Baby to remains with mom  in  condition currently.      Fredi Benedict MD  20  19:37            Electronically signed by Fredi Benedict MD at 20

## 2020-04-14 LAB
APCR PPP: 2.5 RATIO (ref 2.2–3.5)
CMV IGG SERPL IA-ACNC: <0.6 U/ML (ref 0–0.59)
CMV IGM SERPL IA-ACNC: <30 AU/ML (ref 0–29.9)
HSV1+2 IGM SER IA-ACNC: <0.91 RATIO (ref 0–0.9)
LAB AP CASE REPORT: NORMAL
LABORATORY COMMENT REPORT: NORMAL
PATH REPORT.FINAL DX SPEC: NORMAL
PATH REPORT.GROSS SPEC: NORMAL
PROTHROM ACT/NOR PPP: 149 % (ref 50–154)
RUBV IGM SER-ACNC: <20 AU/ML (ref 0–19.9)
T GONDII IGG SERPL IA-ACNC: <3 IU/ML (ref 0–7.1)
T GONDII IGM SER IA-ACNC: <3 AU/ML (ref 0–7.9)

## 2020-04-15 LAB
B19V IGG SER IA-ACNC: 0.3 INDEX (ref 0–0.8)
B19V IGM SER IA-ACNC: 0.1 INDEX (ref 0–0.8)
CARDIOLIPIN IGA SER IA-ACNC: <9 APL U/ML (ref 0–11)
CARDIOLIPIN IGG SER IA-ACNC: <9 GPL U/ML (ref 0–14)
CARDIOLIPIN IGM SER IA-ACNC: <9 MPL U/ML (ref 0–12)
LA NT DPL PPP: 33.2 SEC (ref 0–55)
LA NT DPL/LA NT HPL PPP-RTO: 1.09 RATIO (ref 0–1.4)
LA NT PLATELET PPP: 30.6 SEC (ref 0–51.9)
LUPUS ANTICOAGULANT REFLEX: NORMAL
SCREEN DRVVT: 38.8 SEC (ref 0–47)
THROMBIN TIME: 15 SEC (ref 0–23)

## 2020-04-23 LAB — MTHFR GENE MUT ANL BLD/T: NORMAL

## 2020-04-23 NOTE — DISCHARGE SUMMARY
[facility]  Delivery Discharge Summary    Primary OB Clinician: Fredi Benedict MD    Admission Diagnosis:  Active Problems:    IUFD at 20 weeks or more of gestation      Discharge Diagnosis:  same    Gestational Age: 31w2d    Date of Delivery: 4/12/2020     Delivered By:  Fredi Benedict     Delivery Type: Vaginal, Spontaneous      Tubal Ligation: n/a    Intrapartum Course: Uncomplicated delivery of a stillbirth fetus.  The cause of death was determined to be a cord accident the cord was wrapped around the fetuses hands very tightly next to the face.  The patient was Rh- and did receive RhoGam.  She was see me in 2 weeks in my office for follow-up her postpartum hemoglobin was 13.     Postpartum Course:  Uncomplicated pp course.     Birth Control Method: Undecided    Physical Exam:    Vitals:   Vitals:    04/12/20 2000 04/13/20 0004 04/13/20 0313 04/13/20 0712   BP: 134/77 128/71 131/83    BP Location: Left arm Left arm Left arm    Patient Position: Sitting Sitting Sitting    Pulse: 70 68 67    Resp:  17 17    Temp:  98.7 °F (37.1 °C) 98.2 °F (36.8 °C) 98.4 °F (36.9 °C)   TempSrc:  Oral Oral Oral   SpO2:       Weight:       Height:         No data recorded.      General Appearance:    Alert, cooperative, in no acute distress   Abdomen:     Soft non-tender, non-distended, no guarding, no rebound         tenderness.   Extremities:   Moves all extremities well, no edema, no cyanosis, no              Redness.   Incision:  None   Fundus:   Firm, below umbilicus     Feeding method:  None    Blood Type:    Rh- patient did receive RhoGam    Labs:   Predelivery hemoglobin 13 postdelivery hemoglobin 13            Plan:  Discharge to home.    Follow-up appointment with No att. providers found in 2 weeks.  She was see me in 2 weeks

## 2021-02-21 ENCOUNTER — HOSPITAL ENCOUNTER (OUTPATIENT)
Facility: HOSPITAL | Age: 31
End: 2021-02-21
Attending: OBSTETRICS & GYNECOLOGY | Admitting: OBSTETRICS & GYNECOLOGY

## 2021-02-21 ENCOUNTER — APPOINTMENT (OUTPATIENT)
Dept: ULTRASOUND IMAGING | Facility: HOSPITAL | Age: 31
End: 2021-02-21

## 2021-02-21 ENCOUNTER — HOSPITAL ENCOUNTER (OUTPATIENT)
Facility: HOSPITAL | Age: 31
Discharge: HOME | End: 2021-02-21
Attending: OBSTETRICS & GYNECOLOGY | Admitting: RADIOLOGY

## 2021-02-21 VITALS
OXYGEN SATURATION: 98 % | DIASTOLIC BLOOD PRESSURE: 64 MMHG | TEMPERATURE: 99.1 F | SYSTOLIC BLOOD PRESSURE: 132 MMHG | WEIGHT: 182.98 LBS | RESPIRATION RATE: 20 BRPM | HEIGHT: 66 IN | HEART RATE: 75 BPM | BODY MASS INDEX: 29.41 KG/M2

## 2021-02-21 LAB
BILIRUB UR QL STRIP: NEGATIVE
CLARITY UR: ABNORMAL
COLOR UR: YELLOW
GLUCOSE UR STRIP-MCNC: NEGATIVE MG/DL
HGB UR QL STRIP.AUTO: NEGATIVE
KETONES UR QL STRIP: NEGATIVE
LEUKOCYTE ESTERASE UR QL STRIP.AUTO: NEGATIVE
NITRITE UR QL STRIP: NEGATIVE
PH UR STRIP.AUTO: 7 [PH] (ref 5–8)
PROT UR QL STRIP: NEGATIVE
SP GR UR STRIP: 1.01 (ref 1–1.03)
UROBILINOGEN UR QL STRIP: ABNORMAL

## 2021-02-21 PROCEDURE — 81003 URINALYSIS AUTO W/O SCOPE: CPT | Performed by: OBSTETRICS & GYNECOLOGY

## 2021-02-21 PROCEDURE — G0463 HOSPITAL OUTPT CLINIC VISIT: HCPCS

## 2021-02-21 PROCEDURE — 76819 FETAL BIOPHYS PROFIL W/O NST: CPT

## 2021-02-21 RX ORDER — SODIUM CHLORIDE, SODIUM LACTATE, POTASSIUM CHLORIDE, CALCIUM CHLORIDE 600; 310; 30; 20 MG/100ML; MG/100ML; MG/100ML; MG/100ML
100 INJECTION, SOLUTION INTRAVENOUS CONTINUOUS
Status: DISCONTINUED | OUTPATIENT
Start: 2021-02-21 | End: 2021-02-21 | Stop reason: HOSPADM

## 2021-02-21 RX ORDER — SODIUM CHLORIDE, SODIUM LACTATE, POTASSIUM CHLORIDE, CALCIUM CHLORIDE 600; 310; 30; 20 MG/100ML; MG/100ML; MG/100ML; MG/100ML
500 INJECTION, SOLUTION INTRAVENOUS ONCE
Status: COMPLETED | OUTPATIENT
Start: 2021-02-21 | End: 2021-02-21

## 2021-02-21 RX ORDER — CALCIUM CARBONATE 200(500)MG
2 TABLET,CHEWABLE ORAL 3 TIMES DAILY PRN
Status: DISCONTINUED | OUTPATIENT
Start: 2021-02-21 | End: 2021-02-21 | Stop reason: HOSPADM

## 2021-02-21 RX ADMIN — SODIUM CHLORIDE, POTASSIUM CHLORIDE, SODIUM LACTATE AND CALCIUM CHLORIDE 500 ML: 600; 310; 30; 20 INJECTION, SOLUTION INTRAVENOUS at 02:27

## 2021-02-21 RX ADMIN — CALCIUM CARBONATE 2 TABLET: 500 TABLET, CHEWABLE ORAL at 02:34

## 2021-03-11 ENCOUNTER — TRANSCRIBE ORDERS (OUTPATIENT)
Dept: ADMINISTRATIVE | Facility: HOSPITAL | Age: 31
End: 2021-03-11

## 2021-03-11 ENCOUNTER — LAB (OUTPATIENT)
Dept: LAB | Facility: HOSPITAL | Age: 31
End: 2021-03-11

## 2021-03-11 DIAGNOSIS — R73.09 ABNORMAL BLOOD SUGAR: Primary | ICD-10-CM

## 2021-03-11 DIAGNOSIS — R73.09 ABNORMAL BLOOD SUGAR: ICD-10-CM

## 2021-03-11 LAB
GLUCOSE P FAST SERPL-MCNC: 93 MG/DL (ref 65–94)
GTT GEST 2H PNL UR+SERPL: 140 MG/DL (ref 65–179)
GTT GEST 3H PNL SERPL: 116 MG/DL (ref 65–154)
GTT GEST 3H PNL SERPL: 95 MG/DL (ref 65–139)

## 2021-03-11 PROCEDURE — 82952 GTT-ADDED SAMPLES: CPT

## 2021-03-11 PROCEDURE — 36415 COLL VENOUS BLD VENIPUNCTURE: CPT

## 2021-03-11 PROCEDURE — 82951 GLUCOSE TOLERANCE TEST (GTT): CPT

## 2021-04-15 ENCOUNTER — HOSPITAL ENCOUNTER (INPATIENT)
Facility: HOSPITAL | Age: 31
LOS: 2 days | Discharge: HOME | End: 2021-04-17
Attending: OBSTETRICS & GYNECOLOGY | Admitting: OBSTETRICS & GYNECOLOGY

## 2021-04-15 PROBLEM — Z34.90 PREGNANT: Status: ACTIVE | Noted: 2021-04-15

## 2021-04-15 PROCEDURE — 86900 BLOOD TYPING SEROLOGIC ABO: CPT | Performed by: OBSTETRICS & GYNECOLOGY

## 2021-04-15 PROCEDURE — G0432 EIA HIV-1/HIV-2 SCREEN: HCPCS | Performed by: OBSTETRICS & GYNECOLOGY

## 2021-04-15 PROCEDURE — 85027 COMPLETE CBC AUTOMATED: CPT | Performed by: OBSTETRICS & GYNECOLOGY

## 2021-04-15 PROCEDURE — U0003 INFECTIOUS AGENT DETECTION BY NUCLEIC ACID (DNA OR RNA); SEVERE ACUTE RESPIRATORY SYNDROME CORONAVIRUS 2 (SARS-COV-2) (CORONAVIRUS DISEASE [COVID-19]), AMPLIFIED PROBE TECHNIQUE, MAKING USE OF HIGH THROUGHPUT TECHNOLOGIES AS DESCRIBED BY CMS-2020-01-R: HCPCS | Performed by: OBSTETRICS & GYNECOLOGY

## 2021-04-15 PROCEDURE — 86592 SYPHILIS TEST NON-TREP QUAL: CPT | Performed by: OBSTETRICS & GYNECOLOGY

## 2021-04-15 PROCEDURE — 25010000002 PENICILLIN G POTASSIUM PER 600000 UNITS: Performed by: OBSTETRICS & GYNECOLOGY

## 2021-04-15 PROCEDURE — 86850 RBC ANTIBODY SCREEN: CPT | Performed by: OBSTETRICS & GYNECOLOGY

## 2021-04-15 PROCEDURE — 86870 RBC ANTIBODY IDENTIFICATION: CPT | Performed by: OBSTETRICS & GYNECOLOGY

## 2021-04-15 PROCEDURE — 86901 BLOOD TYPING SEROLOGIC RH(D): CPT | Performed by: OBSTETRICS & GYNECOLOGY

## 2021-04-15 RX ORDER — SODIUM CHLORIDE 0.9 % (FLUSH) 0.9 %
10 SYRINGE (ML) INJECTION AS NEEDED
Status: DISCONTINUED | OUTPATIENT
Start: 2021-04-15 | End: 2021-04-16 | Stop reason: HOSPADM

## 2021-04-15 RX ORDER — SODIUM CHLORIDE, SODIUM LACTATE, POTASSIUM CHLORIDE, CALCIUM CHLORIDE 600; 310; 30; 20 MG/100ML; MG/100ML; MG/100ML; MG/100ML
125 INJECTION, SOLUTION INTRAVENOUS CONTINUOUS
Status: DISCONTINUED | OUTPATIENT
Start: 2021-04-15 | End: 2021-04-17

## 2021-04-15 RX ORDER — ONDANSETRON 2 MG/ML
4 INJECTION INTRAMUSCULAR; INTRAVENOUS EVERY 6 HOURS PRN
Status: DISCONTINUED | OUTPATIENT
Start: 2021-04-15 | End: 2021-04-17

## 2021-04-15 RX ORDER — MAGNESIUM CARB/ALUMINUM HYDROX 105-160MG
30 TABLET,CHEWABLE ORAL ONCE AS NEEDED
Status: DISCONTINUED | OUTPATIENT
Start: 2021-04-16 | End: 2021-04-16 | Stop reason: HOSPADM

## 2021-04-15 RX ORDER — CALCIUM CARBONATE 200(500)MG
2 TABLET,CHEWABLE ORAL 3 TIMES DAILY PRN
Status: DISCONTINUED | OUTPATIENT
Start: 2021-04-15 | End: 2021-04-17

## 2021-04-15 RX ORDER — ALUMINA, MAGNESIA, AND SIMETHICONE 2400; 2400; 240 MG/30ML; MG/30ML; MG/30ML
15 SUSPENSION ORAL EVERY 6 HOURS PRN
Status: DISCONTINUED | OUTPATIENT
Start: 2021-04-15 | End: 2021-04-17

## 2021-04-15 RX ORDER — LIDOCAINE HYDROCHLORIDE 10 MG/ML
30 INJECTION, SOLUTION EPIDURAL; INFILTRATION; INTRACAUDAL; PERINEURAL ONCE AS NEEDED
Status: DISCONTINUED | OUTPATIENT
Start: 2021-04-15 | End: 2021-04-17

## 2021-04-15 RX ORDER — LIDOCAINE HYDROCHLORIDE 10 MG/ML
30 INJECTION, SOLUTION EPIDURAL; INFILTRATION; INTRACAUDAL; PERINEURAL ONCE
Status: DISCONTINUED | OUTPATIENT
Start: 2021-04-16 | End: 2021-04-15

## 2021-04-15 RX ORDER — PRENATAL VIT/IRON FUM/FOLIC AC 27MG-0.8MG
1 TABLET ORAL DAILY
COMMUNITY

## 2021-04-15 RX ADMIN — SODIUM CHLORIDE, SODIUM LACTATE, POTASSIUM CHLORIDE, AND CALCIUM CHLORIDE 1000 ML: .6; .31; .03; .02 INJECTION, SOLUTION INTRAVENOUS at 22:20

## 2021-04-15 RX ADMIN — SODIUM CHLORIDE, SODIUM LACTATE, POTASSIUM CHLORIDE, AND CALCIUM CHLORIDE 125 ML/HR: .6; .31; .03; .02 INJECTION, SOLUTION INTRAVENOUS at 22:20

## 2021-04-15 RX ADMIN — CALCIUM CARBONATE 2 TABLET: 500 TABLET, CHEWABLE ORAL at 23:13

## 2021-04-15 RX ADMIN — SODIUM CHLORIDE 5 MILLION UNITS: 900 INJECTION INTRAVENOUS at 22:20

## 2021-04-16 ENCOUNTER — ANESTHESIA EVENT (OUTPATIENT)
Dept: LABOR AND DELIVERY | Facility: HOSPITAL | Age: 31
End: 2021-04-16

## 2021-04-16 ENCOUNTER — ANESTHESIA (OUTPATIENT)
Dept: LABOR AND DELIVERY | Facility: HOSPITAL | Age: 31
End: 2021-04-16

## 2021-04-16 LAB
ABO GROUP BLD: NORMAL
ABO GROUP BLD: NORMAL
BLD GP AB SCN SERPL QL: POSITIVE
DEPRECATED RDW RBC AUTO: 42.4 FL (ref 37–54)
ERYTHROCYTE [DISTWIDTH] IN BLOOD BY AUTOMATED COUNT: 13.3 % (ref 12.3–15.4)
FETAL BLEED: NEGATIVE
HCT VFR BLD AUTO: 35.4 % (ref 34–46.6)
HGB BLD-MCNC: 12.5 G/DL (ref 12–15.9)
HIV1+2 AB SER QL: NORMAL
MCH RBC QN AUTO: 32.1 PG (ref 26.6–33)
MCHC RBC AUTO-ENTMCNC: 35.3 G/DL (ref 31.5–35.7)
MCV RBC AUTO: 90.9 FL (ref 79–97)
PLATELET # BLD AUTO: 205 10*3/MM3 (ref 140–450)
PMV BLD AUTO: 9.6 FL (ref 6–12)
RBC # BLD AUTO: 3.9 10*6/MM3 (ref 3.77–5.28)
RESIDUAL RHIG DETECTED: NORMAL
RH BLD: NEGATIVE
RH BLD: NEGATIVE
SARS-COV-2 RNA PNL SPEC NAA+PROBE: NOT DETECTED
T&S EXPIRATION DATE: NORMAL
WBC # BLD AUTO: 7.6 10*3/MM3 (ref 3.4–10.8)

## 2021-04-16 PROCEDURE — 10907ZC DRAINAGE OF AMNIOTIC FLUID, THERAPEUTIC FROM PRODUCTS OF CONCEPTION, VIA NATURAL OR ARTIFICIAL OPENING: ICD-10-PCS | Performed by: OBSTETRICS & GYNECOLOGY

## 2021-04-16 PROCEDURE — 3E033VJ INTRODUCTION OF OTHER HORMONE INTO PERIPHERAL VEIN, PERCUTANEOUS APPROACH: ICD-10-PCS | Performed by: OBSTETRICS & GYNECOLOGY

## 2021-04-16 PROCEDURE — 25010000002 ONDANSETRON PER 1 MG: Performed by: OBSTETRICS & GYNECOLOGY

## 2021-04-16 PROCEDURE — 86901 BLOOD TYPING SEROLOGIC RH(D): CPT | Performed by: OBSTETRICS & GYNECOLOGY

## 2021-04-16 PROCEDURE — 25010000003 PENICILLIN G POTASSIUM PER 600000 UNITS: Performed by: OBSTETRICS & GYNECOLOGY

## 2021-04-16 PROCEDURE — C1755 CATHETER, INTRASPINAL: HCPCS | Performed by: ANESTHESIOLOGY

## 2021-04-16 PROCEDURE — 85461 HEMOGLOBIN FETAL: CPT | Performed by: OBSTETRICS & GYNECOLOGY

## 2021-04-16 PROCEDURE — 86900 BLOOD TYPING SEROLOGIC ABO: CPT | Performed by: OBSTETRICS & GYNECOLOGY

## 2021-04-16 PROCEDURE — 25010000002 ONDANSETRON PER 1 MG: Performed by: ANESTHESIOLOGY

## 2021-04-16 RX ORDER — CARBOPROST TROMETHAMINE 250 UG/ML
250 INJECTION, SOLUTION INTRAMUSCULAR AS NEEDED
Status: DISCONTINUED | OUTPATIENT
Start: 2021-04-16 | End: 2021-04-16 | Stop reason: HOSPADM

## 2021-04-16 RX ORDER — LANOLIN 100 %
OINTMENT (GRAM) TOPICAL
Status: DISCONTINUED | OUTPATIENT
Start: 2021-04-16 | End: 2021-04-17 | Stop reason: HOSPADM

## 2021-04-16 RX ORDER — ONDANSETRON 2 MG/ML
4 INJECTION INTRAMUSCULAR; INTRAVENOUS ONCE AS NEEDED
Status: COMPLETED | OUTPATIENT
Start: 2021-04-16 | End: 2021-04-16

## 2021-04-16 RX ORDER — OXYTOCIN-SODIUM CHLORIDE 0.9% IV SOLN 30 UNIT/500ML 30-0.9/5 UT/ML-%
125 SOLUTION INTRAVENOUS CONTINUOUS PRN
Status: COMPLETED | OUTPATIENT
Start: 2021-04-16 | End: 2021-04-16

## 2021-04-16 RX ORDER — OXYTOCIN-SODIUM CHLORIDE 0.9% IV SOLN 30 UNIT/500ML 30-0.9/5 UT/ML-%
2 SOLUTION INTRAVENOUS
Status: DISCONTINUED | OUTPATIENT
Start: 2021-04-16 | End: 2021-04-17

## 2021-04-16 RX ORDER — IBUPROFEN 600 MG/1
600 TABLET ORAL EVERY 6 HOURS PRN
Status: DISCONTINUED | OUTPATIENT
Start: 2021-04-16 | End: 2021-04-17 | Stop reason: HOSPADM

## 2021-04-16 RX ORDER — OXYTOCIN-SODIUM CHLORIDE 0.9% IV SOLN 30 UNIT/500ML 30-0.9/5 UT/ML-%
999 SOLUTION INTRAVENOUS ONCE
Status: DISCONTINUED | OUTPATIENT
Start: 2021-04-16 | End: 2021-04-16 | Stop reason: HOSPADM

## 2021-04-16 RX ORDER — SODIUM CHLORIDE 0.9 % (FLUSH) 0.9 %
1-10 SYRINGE (ML) INJECTION AS NEEDED
Status: DISCONTINUED | OUTPATIENT
Start: 2021-04-16 | End: 2021-04-17 | Stop reason: HOSPADM

## 2021-04-16 RX ORDER — METHYLERGONOVINE MALEATE 0.2 MG/ML
200 INJECTION INTRAVENOUS ONCE AS NEEDED
Status: DISCONTINUED | OUTPATIENT
Start: 2021-04-16 | End: 2021-04-16 | Stop reason: HOSPADM

## 2021-04-16 RX ORDER — EPHEDRINE SULFATE 50 MG/ML
5 INJECTION, SOLUTION INTRAVENOUS
Status: DISCONTINUED | OUTPATIENT
Start: 2021-04-16 | End: 2021-04-16 | Stop reason: HOSPADM

## 2021-04-16 RX ORDER — HYDROCORTISONE ACETATE PRAMOXINE HCL 2.5; 1 G/100G; G/100G
1 CREAM TOPICAL AS NEEDED
Status: DISCONTINUED | OUTPATIENT
Start: 2021-04-16 | End: 2021-04-17 | Stop reason: HOSPADM

## 2021-04-16 RX ORDER — DOCUSATE SODIUM 100 MG/1
100 CAPSULE, LIQUID FILLED ORAL 2 TIMES DAILY
Status: DISCONTINUED | OUTPATIENT
Start: 2021-04-16 | End: 2021-04-17 | Stop reason: HOSPADM

## 2021-04-16 RX ORDER — OXYTOCIN-SODIUM CHLORIDE 0.9% IV SOLN 30 UNIT/500ML 30-0.9/5 UT/ML-%
250 SOLUTION INTRAVENOUS CONTINUOUS
Status: ACTIVE | OUTPATIENT
Start: 2021-04-16 | End: 2021-04-16

## 2021-04-16 RX ORDER — IBUPROFEN 600 MG/1
600 TABLET ORAL ONCE
Status: COMPLETED | OUTPATIENT
Start: 2021-04-16 | End: 2021-04-16

## 2021-04-16 RX ORDER — PRENATAL VIT/IRON FUM/FOLIC AC 27MG-0.8MG
1 TABLET ORAL DAILY
Status: DISCONTINUED | OUTPATIENT
Start: 2021-04-16 | End: 2021-04-17 | Stop reason: HOSPADM

## 2021-04-16 RX ORDER — BISACODYL 10 MG
10 SUPPOSITORY, RECTAL RECTAL DAILY PRN
Status: DISCONTINUED | OUTPATIENT
Start: 2021-04-17 | End: 2021-04-17 | Stop reason: HOSPADM

## 2021-04-16 RX ORDER — ONDANSETRON 4 MG/1
4 TABLET, FILM COATED ORAL EVERY 8 HOURS PRN
Status: DISCONTINUED | OUTPATIENT
Start: 2021-04-16 | End: 2021-04-17 | Stop reason: HOSPADM

## 2021-04-16 RX ORDER — MISOPROSTOL 200 UG/1
800 TABLET ORAL AS NEEDED
Status: DISCONTINUED | OUTPATIENT
Start: 2021-04-16 | End: 2021-04-16 | Stop reason: HOSPADM

## 2021-04-16 RX ORDER — DIPHENHYDRAMINE HYDROCHLORIDE 50 MG/ML
12.5 INJECTION INTRAMUSCULAR; INTRAVENOUS EVERY 8 HOURS PRN
Status: DISCONTINUED | OUTPATIENT
Start: 2021-04-16 | End: 2021-04-16 | Stop reason: HOSPADM

## 2021-04-16 RX ADMIN — SODIUM CHLORIDE, SODIUM LACTATE, POTASSIUM CHLORIDE, AND CALCIUM CHLORIDE 125 ML/HR: .6; .31; .03; .02 INJECTION, SOLUTION INTRAVENOUS at 01:19

## 2021-04-16 RX ADMIN — WITCH HAZEL: 500 SOLUTION RECTAL; TOPICAL at 05:49

## 2021-04-16 RX ADMIN — Medication 10 ML/HR: at 01:00

## 2021-04-16 RX ADMIN — ONDANSETRON 4 MG: 2 INJECTION INTRAMUSCULAR; INTRAVENOUS at 03:05

## 2021-04-16 RX ADMIN — IBUPROFEN 600 MG: 600 TABLET ORAL at 17:18

## 2021-04-16 RX ADMIN — OXYTOCIN 125 ML/HR: 10 INJECTION, SOLUTION INTRAMUSCULAR; INTRAVENOUS at 05:14

## 2021-04-16 RX ADMIN — BENZOCAINE 2 SPRAY: 11.4 AEROSOL, SPRAY TOPICAL at 05:49

## 2021-04-16 RX ADMIN — OXYTOCIN 2 MILLI-UNITS/MIN: 10 INJECTION, SOLUTION INTRAMUSCULAR; INTRAVENOUS at 02:59

## 2021-04-16 RX ADMIN — PRENATAL VITAMINS-IRON FUMARATE 27 MG IRON-FOLIC ACID 0.8 MG TABLET 1 TABLET: at 08:51

## 2021-04-16 RX ADMIN — ONDANSETRON 4 MG: 2 INJECTION INTRAMUSCULAR; INTRAVENOUS at 02:09

## 2021-04-16 RX ADMIN — DOCUSATE SODIUM 100 MG: 100 CAPSULE, LIQUID FILLED ORAL at 08:51

## 2021-04-16 RX ADMIN — IBUPROFEN 600 MG: 600 TABLET ORAL at 05:48

## 2021-04-16 RX ADMIN — PENICILLIN G POTASSIUM 2.5 MILLION UNITS: 20000000 INJECTION, POWDER, FOR SOLUTION INTRAVENOUS at 02:10

## 2021-04-16 RX ADMIN — DOCUSATE SODIUM 100 MG: 100 CAPSULE, LIQUID FILLED ORAL at 20:25

## 2021-04-16 NOTE — ANESTHESIA PROCEDURE NOTES
Labor Epidural      Patient reassessed immediately prior to procedure    Patient location during procedure: OB  Start Time: 4/16/2021 12:50 AM  Performed By  Anesthesiologist: Arjun Millard MD  Preanesthetic Checklist  Completed: patient identified, IV checked, site marked, risks and benefits discussed, surgical consent, monitors and equipment checked, pre-op evaluation and timeout performed  Prep:  Pt Position:sitting  Sterile Tech:mask, sterile barrier, gloves and cap  Prep:chlorhexidine gluconate and isopropyl alcohol  Monitoring:blood pressure monitoring, continuous pulse oximetry and EKG  Epidural Block Procedure:  Approach:midline  Guidance:landmark technique and palpation technique  Location:L4-L5  Needle Type:Tuohy  Needle Gauge:17 G  Loss of Resistance Medium: saline  Loss of Resistance: 5cm  Cath Depth at skin:12 cm  Paresthesia: none  Aspiration:negative  Test Dose:negative  Med administered at 4/16/2021 12:07 AM  Number of Attempts: 1  Post Assessment:  Dressing:occlusive dressing applied and secured with tape  Pt Tolerance:patient tolerated the procedure well with no apparent complications  Complications:no

## 2021-04-16 NOTE — H&P
DEMARIO Howell  Obstetric History and Physical     Chief Complaint: Contractions    Subjective     Patient is a 30 y.o. female  currently at 36w6d, who presents with contractions that started 3 hours ago.  She is having lower back pain pelvic pain and definitely tightening.  Pregnancy history of a 31-week stillbirth and 1 prior vaginal birth living..    Her prenatal care is benign.  Her previous obstetric/gynecological history is noted for is non-contributory.      Prenatal Information:  Prenatal Results     POC Urine Glucose/Protein     Test Value Reference Range Date Time    Urine Glucose        Urine Protein              Initial Prenatal Labs     Test Value Reference Range Date Time    Hemoglobin        Hematocrit        Platelets  239 10*3/mm3 140 - 450 20 0731    Rubella IgG ^ Immune   19     Hepatitis B SAg ^ Negative   19     Hepatitis C Ab ^ negative   19     RPR  Non-Reactive  Non-Reactive 20 1603    ABO  A   20 1733    Rh  Negative   20 1733    Antibody Screen        HIV ^ Negative   19     Urine Culture        Gonorrhea        Chlamydia        TSH  1.950 uIU/mL 0.270 - 4.200 20 1733          2nd and 3rd Trimester     Test Value Reference Range Date Time    Hemoglobin (repeated)        Hematocrit (repeated)        GCT        Antibody Screen (repeated)        GTT Fasting  93 mg/dL 65 - 94 21 0939    GTT 1 Hr  140 mg/dL 65 - 179 21 1043    GTT 2 Hr  116 mg/dL 65 - 154 21 1145    GTT 3 Hr  95 mg/dL 65 - 139 21 1326    Group B Strep              Drug Screening     Test Value Reference Range Date Time    Amphetamine Screen        Barbiturate Screen        Benzodiazepine Screen        Methadone Screen        Phencyclidine Screen        Opiates Screen        THC Screen        Cocaine Screen        Propoxyphene Screen        Buprenorphine Screen        Methamphetamine Screen        Oxycodone Screen        Tricyclic Antidepressants  Screen              Other (Risk screening)     Test Value Reference Range Date Time    Varicella IgG        Parvovirus IgG        CMV IgG        Cystic Fibrosis        Hemoglobin electrophoresis        NIPT        MSAFP-4        AFP (for NTD only)              Legend    ^: Historical                      External Prenatal Results     Pregnancy Outside Results - Transcribed From Office Records - See Scanned Records For Details     Test Value Date Time    Hgb  13.1 g/dL 04/13/20 0731    Hct  36.0 % 04/13/20 0731    ABO  A  04/11/20 1733    Rh  Negative  04/11/20 1733    Antibody Screen  Positive  04/11/20 1733    Glucose Fasting GTT  93 mg/dL 03/11/21 0939    Glucose Tolerance Test 1 hour  140 mg/dL 03/11/21 1043    Glucose Tolerance Test 3 hour  95 mg/dL 03/11/21 1326    Gonorrhea (discrete)       Chlamydia (discrete)       RPR  Non-Reactive  04/11/20 1603    VDRL       Syphilis Antibody       Rubella ^ Immune  12/06/19     HBsAg ^ Negative  12/06/19     Herpes Simplex Virus PCR       Herpes Simplex VIrus Culture       HIV ^ Negative  12/06/19     Hep C RNA Quant PCR       Hep C Antibody ^ negative  12/06/19     AFP       Group B Strep       GBS Susceptibility to Clindamycin       GBS Susceptibility to Erythromycin       Fetal Fibronectin       Genetic Testing, Maternal Blood             Drug Screening     Test Value Date Time    Urine Drug Screen       Amphetamine Screen       Barbiturate Screen  Negative  04/11/20 1540    Benzodiazepine Screen  Negative  04/11/20 1540    Methadone Screen  Negative  04/11/20 1540    Phencyclidine Screen       Opiates Screen  Negative  04/11/20 1540    THC Screen  Positive  04/11/20 1540    Cocaine Screen       Propoxyphene Screen       Buprenorphine Screen       Methamphetamine Screen       Oxycodone Screen  Negative  04/11/20 1540    Tricyclic Antidepressants Screen             Legend    ^: Historical                         Past OB History: 2 prior vaginal births       Past  "Medical History: No past medical history on file.     Past Surgical History No past surgical history on file.     Family History: No family history on file.   Social History:  reports that she has never smoked. She has never used smokeless tobacco.   reports previous alcohol use.   reports no history of drug use.        General ROS: Pertinent items are noted in HPI    Objective      Vitals:     Vitals:    04/15/21 2144 04/15/21 2145 04/15/21 2200   BP:  119/76 137/78   BP Location:  Right arm    Pulse:  77 86   Resp:  20    Temp: 98.5 °F (36.9 °C) 98.3 °F (36.8 °C)    TempSrc: Oral Oral    SpO2:  99%    Weight:  81.2 kg (179 lb 0.2 oz)    Height:  167.6 cm (66\")        Fetal Heart Rate Assessment:   Category 1    Harwood:   Irregular     Physical Exam:     General Appearance:    Alert, cooperative, in no acute distress   Lungs:     Clear to auscultation,respirations regular.    Heart:    Regular rhythm and normal rate.   Breast Exam:    Deferred   Abdomen:     Normal bowel sounds, no masses, soft non-tender,         non-distended, no guarding, no rebound tenderness   Pelvic Exam:   6 cm completely effaced +1 station intact bag of membranes cephalic presentation estimated fetal weight 6-1/2 pounds by Leopold's pelvis adequate.    Presentation:     Cervix:    Extremities:   Moves all extremities well, no edema, no cyanosis, no              redness   Skin:   No bleeding, bruising or rash   Neurologic:   No focal neurologic defect          Laboratory Results:   Lab Results (last 48 hours)     ** No results found for the last 48 hours. **          Other Studies:      Assessment/Plan     Active Problems:    Pregnant         Assessment:  1.  Intrauterine pregnancy at 36w6d gestation with reactive fetal status.    2.   labor  without ROM   3.  Obstetrical history significant for is non-contributory.  4.  GBS status: No results found for: STREPGPB    Plan:  1. Vaginal anticipated  2. Plan of care has been reviewed with " patient.  3.  Risks, benefits of treatment plan have been discussed.  4.  All questions have been answered.  5.  GBS unknown it was drawn on 413 and results are not back yet.  I discussed this with the patient and we will be treating her with penicillin prophylactically.  She desires an epidural we will wait to break water after the epidural was placed and penicillin is in.       Fredi Benedict MD   4/15/2021   22:29 EDT

## 2021-04-16 NOTE — ANESTHESIA PREPROCEDURE EVALUATION
Anesthesia Evaluation     Patient summary reviewed and Nursing notes reviewed                Airway   Mallampati: II  TM distance: >3 FB  Neck ROM: full  No difficulty expected  Dental - normal exam     Pulmonary - negative pulmonary ROS and normal exam   Cardiovascular - negative cardio ROS and normal exam        Neuro/Psych- negative ROS  GI/Hepatic/Renal/Endo - negative ROS     Musculoskeletal (-) negative ROS    Abdominal  - normal exam    Bowel sounds: normal.   Substance History - negative use     OB/GYN    (+) Pregnant,         Other                        Anesthesia Plan    ASA 2     epidural       Anesthetic plan, all risks, benefits, and alternatives have been provided, discussed and informed consent has been obtained with: patient.

## 2021-04-16 NOTE — LACTATION NOTE
This note was copied from a baby's chart.  Pt denies hx of breast surgery, no allergy to wool or foods. Medela gel patches provided, instructed on use.   States she bf her 7 yo x 2 mo. She has a breast pump in shipment. Plans to stay home with children.  Teaching done. Bf dvd. observed feeding baby in lt cross cradle hold, colostrum easily expressed.  Encouraged to do skin to skin, feed baby on demand. Will call for help as needed.

## 2021-04-16 NOTE — PAYOR COMM NOTE
"This is delivery notification only for Kenyon Mulligan    Pt had a vaginal delivery on 21.    Please note: This is NOT an inpatient request for prior authorization. This is just notification of delivery. If the patient exceeds the 48/96 federal guideline allowed for delivery, a prior auth form/request will be submitted. Thank you.     Kenyon Mulligan (30 y.o. Female)     Date of Birth Social Security Number Address Home Phone MRN    1990  [id number] [address] [phone] [id number]    Yarsanism Marital Status          None        Admission Date Admission Type Admitting Provider Attending Provider Department, Room/Bed    4/15/21 Elective Fredi Bneedict MD Huecker, Braidi Rose, MD [facility] MOTHER BABY, M407/1    Discharge Date Discharge Disposition Discharge Destination                       Attending Provider: Fredi Benedict MD    Allergies: No Known Allergies    Isolation: None   Infection: None   Code Status: CPR    Ht: 167.6 cm (66\")   Wt: 81.2 kg (179 lb 0.2 oz)    Admission Cmt: None   Principal Problem: None                Active Insurance as of 4/15/2021     Primary Coverage     Payor Plan Insurance Group Employer/Plan Group    [place] [id number]      Payor Plan Address Payor Plan Phone Number Payor Plan Fax Number Effective Dates    [address]   2020 - None Entered    [facility] [id number]       Subscriber Name Subscriber Birth Date Member ID       KENYON MULLIGAN 1990 [id number]                 Emergency Contacts      (Rel.) Home Phone Work Phone Mobile Phone    HOWIE STALLWORTH (Mother) [phone] -- [phone]               Operative/Procedure Notes (last 48 hours) (Notes from 21 0904 through 21 0904)      Fredi Benedict MD at 21 0402          [facility]  Vaginal Delivery Note    Diagnosis     Patient is a 30 y.o. female  currently at 37w0d, " who presents with  labor.  History of a prior stillbirth      Delivery     Delivery:  Spontaneous Vaginal Delivery    Date of Delivery:  2021   Anesthesia: This patient has no babies on file.    Delivering clinician: Fredi Benedict MD      Delivery narrative: This patient presented to labor and delivery in  labor.  She was 36 and 6 on presentation.  She was GBS unknown and therefore was given penicillin for prophylaxis.  She was 6 cm on admission she progressed to 7 to 8 cm.  She was artificially ruptured and progressed to complete complete +2 station with the urge to push.  She had a reactive category 1 tracing throughout her course.  She pushed with excellent effort for approximately 5 minutes and delivered over an intact perineum.  She delivered a live viable male infant with Apgars of 9 and 9 at 1 and 5 minutes.  Infant had good cry color and tone was moving all 4 extremities upon delivery.  Infant was bulb suctioned after complete delivery cord was clamped and cut the infant was placed on mother's abdomen.  Cord blood was sent for analysis, Pitocin was given IV the placenta delivered spontaneously intact with a three-vessel cord and was set aside.  The uterus cervix and vagina were explored and no lacerations were noted.  The patient tolerated the procedure well sponge lap needle counts were correct mom and baby are doing well in her room at the time of this dictation.  Her fundus was firm and hemostasis was noted at the end of the case.    Infant    Findings: VMI     Apgars:  9 and 9 at 1 and 5 minutes.      Placenta, Cord, and Fluid    Placenta delivered  spontaneous  3VC          Lacerations       had no lacerations. with 3.0 Vicryl rapide in the usual fashion.     Estimated Blood Loss  35 cc this was the QBL     Complications  none    Disposition  Mother to Mother Baby/Postpartum  in stable condition currently.  Baby to remains with mom  in stable condition currently.      Fredi  Malinda Benedict MD  04/16/21  04:02 EDT            Electronically signed by Fredi Benedict MD at 04/16/21 1070

## 2021-04-16 NOTE — L&D DELIVERY NOTE
[facility]  Vaginal Delivery Note    Diagnosis     Patient is a 30 y.o. female  currently at 37w0d, who presents with  labor.  History of a prior stillbirth      Delivery     Delivery:  Spontaneous Vaginal Delivery    Date of Delivery:  2021   Anesthesia: This patient has no babies on file.    Delivering clinician: Fredi Benedict MD      Delivery narrative: This patient presented to labor and delivery in  labor.  She was 36 and 6 on presentation.  She was GBS unknown and therefore was given penicillin for prophylaxis.  She was 6 cm on admission she progressed to 7 to 8 cm.  She was artificially ruptured and progressed to complete complete +2 station with the urge to push.  She had a reactive category 1 tracing throughout her course.  She pushed with excellent effort for approximately 5 minutes and delivered over an intact perineum.  She delivered a live viable male infant with Apgars of 9 and 9 at 1 and 5 minutes.  Infant had good cry color and tone was moving all 4 extremities upon delivery.  Infant was bulb suctioned after complete delivery cord was clamped and cut the infant was placed on mother's abdomen.  Cord blood was sent for analysis, Pitocin was given IV the placenta delivered spontaneously intact with a three-vessel cord and was set aside.  The uterus cervix and vagina were explored and no lacerations were noted.  The patient tolerated the procedure well sponge lap needle counts were correct mom and baby are doing well in her room at the time of this dictation.  Her fundus was firm and hemostasis was noted at the end of the case.    Infant    Findings: VMI     Apgars:  9 and 9 at 1 and 5 minutes.      Placenta, Cord, and Fluid    Placenta delivered  spontaneous  3VC          Lacerations       had no lacerations. with 3.0 Vicryl rapide in the usual fashion.     Estimated Blood Loss  35 cc this was the QBL     Complications  none    Disposition  Mother to Mother Baby/Postpartum   in stable condition currently.  Baby to remains with mom  in stable condition currently.      Fredi Benedict MD  04/16/21  04:02 EDT

## 2021-04-17 VITALS
HEIGHT: 66 IN | TEMPERATURE: 98.4 F | RESPIRATION RATE: 16 BRPM | HEART RATE: 59 BPM | BODY MASS INDEX: 28.77 KG/M2 | OXYGEN SATURATION: 98 % | DIASTOLIC BLOOD PRESSURE: 81 MMHG | SYSTOLIC BLOOD PRESSURE: 129 MMHG | WEIGHT: 179.01 LBS

## 2021-04-17 LAB
BASOPHILS # BLD AUTO: 0 10*3/MM3 (ref 0–0.2)
BASOPHILS NFR BLD AUTO: 0.4 % (ref 0–1.5)
DEPRECATED RDW RBC AUTO: 44.6 FL (ref 37–54)
EOSINOPHIL # BLD AUTO: 0.1 10*3/MM3 (ref 0–0.4)
EOSINOPHIL NFR BLD AUTO: 1.2 % (ref 0.3–6.2)
ERYTHROCYTE [DISTWIDTH] IN BLOOD BY AUTOMATED COUNT: 13.8 % (ref 12.3–15.4)
HCT VFR BLD AUTO: 34.2 % (ref 34–46.6)
HGB BLD-MCNC: 11.5 G/DL (ref 12–15.9)
LYMPHOCYTES # BLD AUTO: 1.8 10*3/MM3 (ref 0.7–3.1)
LYMPHOCYTES NFR BLD AUTO: 27.1 % (ref 19.6–45.3)
MCH RBC QN AUTO: 31.4 PG (ref 26.6–33)
MCHC RBC AUTO-ENTMCNC: 33.7 G/DL (ref 31.5–35.7)
MCV RBC AUTO: 93.1 FL (ref 79–97)
MONOCYTES # BLD AUTO: 0.6 10*3/MM3 (ref 0.1–0.9)
MONOCYTES NFR BLD AUTO: 9.2 % (ref 5–12)
NEUTROPHILS NFR BLD AUTO: 4.1 10*3/MM3 (ref 1.7–7)
NEUTROPHILS NFR BLD AUTO: 62.1 % (ref 42.7–76)
NRBC BLD AUTO-RTO: 0.1 /100 WBC (ref 0–0.2)
NUMBER OF DOSES: NORMAL
PLATELET # BLD AUTO: 185 10*3/MM3 (ref 140–450)
PMV BLD AUTO: 9 FL (ref 6–12)
RBC # BLD AUTO: 3.67 10*6/MM3 (ref 3.77–5.28)
RPR SER QL: NORMAL
WBC # BLD AUTO: 6.6 10*3/MM3 (ref 3.4–10.8)

## 2021-04-17 PROCEDURE — 85025 COMPLETE CBC W/AUTO DIFF WBC: CPT | Performed by: OBSTETRICS & GYNECOLOGY

## 2021-04-17 RX ADMIN — IBUPROFEN 600 MG: 600 TABLET ORAL at 00:06

## 2021-04-17 RX ADMIN — IBUPROFEN 600 MG: 600 TABLET ORAL at 08:04

## 2021-04-17 RX ADMIN — DOCUSATE SODIUM 100 MG: 100 CAPSULE, LIQUID FILLED ORAL at 08:04

## 2021-04-17 RX ADMIN — PRENATAL VITAMINS-IRON FUMARATE 27 MG IRON-FOLIC ACID 0.8 MG TABLET 1 TABLET: at 08:04

## 2021-04-17 NOTE — DISCHARGE SUMMARY
[facility]  Delivery Discharge Summary    Primary OB Clinician: Fredi Benedict MD    Admission Diagnosis:  Active Problems:    Pregnant      Discharge Diagnosis:  <principal problem not specified>    Gestational Age: 37w0d    Date of Delivery: 2021     Delivered By:  Fredi Benedict     Delivery Type: Vaginal, Spontaneous      Tubal Ligation: n/a    Intrapartum Course: Uncomplicated delivery.     Postpartum Course:  Uncomplicated pp course.     Physical Exam:    Vitals:   Vitals:    21 1941 21 2300 21 0300 21 0730   BP: 159/77 110/60 137/84 129/81   BP Location: Right arm Right arm Right arm Right arm   Patient Position: Lying Lying Sitting Sitting   Pulse: 67 62 69 59   Resp:    Temp: 98.7 °F (37.1 °C) 98.1 °F (36.7 °C) 98.2 °F (36.8 °C) 98.4 °F (36.9 °C)   TempSrc: Oral Oral Oral Oral   SpO2: 99% 98% 98% 98%   Weight:       Height:         Temp (24hrs), Av.4 °F (36.9 °C), Min:98.1 °F (36.7 °C), Max:98.7 °F (37.1 °C)      General Appearance:    Alert, cooperative, in no acute distress   Abdomen:     Soft non-tender, non-distended, no guarding, no rebound         tenderness.   Extremities:   Moves all extremities well, no edema, no cyanosis, no              Redness.   Incision:   N/a   Fundus:   Firm, below umbilicus     Feeding method: Breastfeeding Status: Yes    Blood Type: RH Negative Infant RH Negative      Plan:  Discharge to home.    Follow-up appointment with  in 6 weeks.

## 2021-04-17 NOTE — LACTATION NOTE
This note was copied from a baby's chart.  Mother reports feedings are going well, especially at night. States she has difficulty waking baby during the day, discussed and instructed. Baby woke and latched wide. Discussed several topics including going home. Plans for discharge today. Provided with  discharge weight ticket and lactation contact card. Encouraged to call as needed.

## 2021-04-17 NOTE — PLAN OF CARE
Goal Outcome Evaluation:      Bleeding and vitals within normal limits for this shift. Pt voids and ambulates. Pain controlled with  PO motrin.

## 2021-04-17 NOTE — NURSING NOTE
Pt frustrated about being woken for vital sign checks and fundal assessments. Pt educated that RN and CNA have attempted to cluster care while still performing nursing duties. RN offered to taken infant to nursery for patient to sleep but patient refused.

## 2021-04-19 NOTE — SIGNIFICANT NOTE
Case Management Discharge Note                Selected Continued Care - Discharged on 4/17/2021 Admission date: 4/15/2021 - Discharge disposition: Home or Self Care                   Final Discharge Disposition Code: (P) 01 - home or self-care
